# Patient Record
Sex: FEMALE | Race: BLACK OR AFRICAN AMERICAN | NOT HISPANIC OR LATINO | Employment: UNEMPLOYED | URBAN - METROPOLITAN AREA
[De-identification: names, ages, dates, MRNs, and addresses within clinical notes are randomized per-mention and may not be internally consistent; named-entity substitution may affect disease eponyms.]

---
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
## 2025-05-01 ENCOUNTER — TELEPHONE (OUTPATIENT)
Dept: PSYCHIATRY | Facility: CLINIC | Age: 20
End: 2025-05-01
Payer: MEDICAID

## 2025-05-01 NOTE — TELEPHONE ENCOUNTER
----- Message from Leigh Ann sent at 5/1/2025 10:23 AM CDT -----  Regarding: Referral  Good morning,Dr. Kassandra Jaime would like to refer the following patient to the Ped Psych department. The patients diagnosis is Encounter for depression and other specified anxiety disorders. I have scanned the patients referral and records into [facility]. Thanks,Leigh Ann Rodgers

## 2025-05-01 NOTE — TELEPHONE ENCOUNTER
Spoke with Yolanda and informed her that because she's 19 now turning 20 this monthe she would have to reach out for adult services at the [facility]. She expressed understanding and was provided a contact.